# Patient Record
Sex: FEMALE | Race: BLACK OR AFRICAN AMERICAN | Employment: OTHER | URBAN - METROPOLITAN AREA
[De-identification: names, ages, dates, MRNs, and addresses within clinical notes are randomized per-mention and may not be internally consistent; named-entity substitution may affect disease eponyms.]

---

## 2017-08-15 ENCOUNTER — HOSPITAL ENCOUNTER (OUTPATIENT)
Dept: PREADMISSION TESTING | Age: 67
Discharge: HOME | End: 2017-08-15
Payer: MEDICARE

## 2017-08-15 VITALS — HEIGHT: 63 IN | BODY MASS INDEX: 26.05 KG/M2 | WEIGHT: 147 LBS

## 2017-08-15 LAB
ANION GAP BLD CALC-SCNC: 8 MMOL/L (ref 3–18)
BUN SERPL-MCNC: 103 MG/DL (ref 7–18)
BUN/CREAT SERPL: 13 (ref 12–20)
CALCIUM SERPL-MCNC: 8.5 MG/DL (ref 8.5–10.1)
CHLORIDE SERPL-SCNC: 99 MMOL/L (ref 100–108)
CO2 SERPL-SCNC: 34 MMOL/L (ref 21–32)
CREAT SERPL-MCNC: 8.07 MG/DL (ref 0.6–1.3)
EST. AVERAGE GLUCOSE BLD GHB EST-MCNC: 123 MG/DL
GLUCOSE SERPL-MCNC: 112 MG/DL (ref 74–99)
HBA1C MFR BLD: 5.9 % (ref 4.5–5.6)
HCT VFR BLD AUTO: 30.3 % (ref 35–45)
HGB BLD-MCNC: 9.5 G/DL (ref 12–16)
POTASSIUM SERPL-SCNC: 4.9 MMOL/L (ref 3.5–5.5)
SODIUM SERPL-SCNC: 141 MMOL/L (ref 136–145)

## 2017-08-15 PROCEDURE — 85018 HEMOGLOBIN: CPT | Performed by: SURGERY

## 2017-08-15 PROCEDURE — 80048 BASIC METABOLIC PNL TOTAL CA: CPT | Performed by: SURGERY

## 2017-08-15 PROCEDURE — 36415 COLL VENOUS BLD VENIPUNCTURE: CPT | Performed by: SURGERY

## 2017-08-15 PROCEDURE — 93005 ELECTROCARDIOGRAM TRACING: CPT

## 2017-08-15 PROCEDURE — 83036 HEMOGLOBIN GLYCOSYLATED A1C: CPT | Performed by: SURGERY

## 2017-08-15 RX ORDER — SODIUM BICARBONATE 650 MG/1
650 TABLET ORAL 3 TIMES DAILY
COMMUNITY

## 2017-08-15 RX ORDER — INSULIN GLARGINE 100 [IU]/ML
35 INJECTION, SOLUTION SUBCUTANEOUS DAILY
COMMUNITY

## 2017-08-15 RX ORDER — CALCIUM ACETATE 667 MG/1
1 CAPSULE ORAL 2 TIMES DAILY
COMMUNITY

## 2017-08-15 RX ORDER — CARVEDILOL 6.25 MG/1
6.25 TABLET ORAL 2 TIMES DAILY WITH MEALS
COMMUNITY

## 2017-08-15 RX ORDER — CALCITRIOL 0.25 UG/1
0.25 CAPSULE ORAL DAILY
COMMUNITY

## 2017-08-15 RX ORDER — AMLODIPINE BESYLATE 10 MG/1
10 TABLET ORAL DAILY
COMMUNITY

## 2017-08-15 RX ORDER — SODIUM CHLORIDE, SODIUM LACTATE, POTASSIUM CHLORIDE, CALCIUM CHLORIDE 600; 310; 30; 20 MG/100ML; MG/100ML; MG/100ML; MG/100ML
125 INJECTION, SOLUTION INTRAVENOUS CONTINUOUS
Status: CANCELLED | OUTPATIENT
Start: 2017-08-15

## 2017-08-16 LAB
ATRIAL RATE: 76 BPM
CALCULATED P AXIS, ECG09: 60 DEGREES
CALCULATED R AXIS, ECG10: 40 DEGREES
CALCULATED T AXIS, ECG11: 56 DEGREES
DIAGNOSIS, 93000: NORMAL
P-R INTERVAL, ECG05: 142 MS
Q-T INTERVAL, ECG07: 410 MS
QRS DURATION, ECG06: 64 MS
QTC CALCULATION (BEZET), ECG08: 461 MS
VENTRICULAR RATE, ECG03: 76 BPM

## 2017-08-30 NOTE — H&P
PATIENT: Lesia Flowers  YOB: 1950  DATE: 2017 1:15 PM   VISIT TYPE: Consult  _____________________________________________________________    Completed Orders (this encounter)  Order Interpretation Result Next Lab Date   surgery instructions given education        Assessment/Plan  # Detail Type Description    1. Assessment End stage renal disease (N18.6). Impression discussed lap PD cath placement, risks and benefits. Patient Plan lap PD cath placement                  This 74year old female presents for PD cath placement. History of Present Illness:  1. PD cath placement      The symptoms began 1 month ago and generally lasts varies. The symptoms are reported as being severe. The symptoms occur constantly. The location is kidneys. The context of the symptoms include renal failure. The symptoms are described as end stage. Aggravating factors include nothing. Relieving factors include nothing. She states the symptoms are chronic and are poorly controlled. she has never had intra-abdominal surgery                PAST MEDICAL/SURGICAL HISTORY  (Detailed)    Disease/disorder Onset Date Management Date Comments   Diabetes mellitus       Kidney disease         Family History:  (Detailed)  Relationship Family Member Name  Age at Death Condition Onset Age Cause of Death       Family history of Cancer, unknown  N       Family history of Renal disease  N       Family history of Hypertension  N       Family history of Diabetes mellitus  N   Mother    Myocardial infarction  Y       Social History:  (Detailed)  Tobacco use reviewed. Preferred language is [place]. MARITAL STATUS/FAMILY/SOCIAL SUPPORT  Currently . Smoking status: Never smoker. SMOKING STATUS  Use Status Type Smoking Status Usage Per Day Years Used Total Pack Years   no/never  Never smoker          Latter day/SPIRITUAL  The patient has None Quaker affiliation.           Patient Status   Completed with information received for patient transitioning into care. Completed with information received for patient in a summary of care record. Medication Reconciliation  Medications reconciled today. Medication Reviewed  Adherence Medication Name Sig Desc Elsewhere Status   taking as directed Coreg 3.125 mg tablet take 1 tablet by oral route 2 times every day with food Y Verified   taking as directed calcium acetate 667 mg capsule take 2 capsule by oral route 3 times every day with meals Y Verified   taking as directed Norvasc 2.5 mg tablet take 1 tablet by oral route  every day Y Verified   taking as directed Lantus 100 unit/mL subcutaneous solution inject by subcutaneous route as per insulin protocol Y Verified   taking as directed Crestor 5 mg tablet take 2 tablet by oral route  every day Y Verified     Allergies:  Ingredient Reaction Medication Name Comment   NO KNOWN ALLERGIES      (Reviewed, no changes.)  Review of Systems  System Neg/Pos Details   Constitutional Negative Fever, night sweats and weight loss. ENMT Negative Hearing loss, tinnitus, vertigo and voice change. Eyes Negative Diplopia and vision loss. Respiratory Negative Asthma, cough, dyspnea, hemoptysis, known TB exposure and wheezing. Cardio Negative Chest pain, claudication, edema, irregular heartbeat/palpitations and thrombophlebitis. GI Negative Bloating, dysphagia, hemorrhoids, jaundice and reflux.  Negative Dysuria, nocturia, passage stone/gravel and urinary incontinence. Endocrine Negative Cold intolerance and goiter. Neuro Negative Focal weakness, headache, paresthesia, seizures and syncope. Integumentary Negative Change in shape/size of mole(s) and skin lesion. MS Positive Back pain. MS Negative Bone/joint symptoms and muscle weakness. Hema/Lymph Negative Easy bleeding and easy bruising. Allergic/Immuno Negative Contact allergy and contact dermatitis. Vascular Comments Leg cramps.           Vital Signs Height  Time ft in cm Last Measured Height Position   1:58 PM 5.0 3.00 160.02 08/08/2017      Weight/BSA/BMI  Time lb oz kg Context BMI kg/m2 BSA m2   1:58 .00  66.678 dressed with shoes 26.04      Blood Pressure  Time BP mm/Hg Position Side Site Method Cuff Size   1:58 /74 sitting right arm automatic adult     Temperature/Pulse/Respiration  Time Temp F Temp C Temp Site Pulse/min Pattern Resp/ min   1:58 PM 98.50 36.94 ear 85 regular      Measured By  Time Measured by   1:58 PM Kymberly Kim       Physical Exam:  Exam Findings Details   Constitutional Normal No acute distress. Well nourished. Well developed. Eyes Normal General - Right: Normal, Left: Normal. Sclera - Right: Normal, Left: Normal.   Neck Exam Normal Inspection - Normal. Palpation - Normal.   Lymph Detail Normal Anterior cervical. Posterior cervical.   Respiratory Normal Cough - Absent. Effort - Normal.   Cardiovascular Normal Inspection - JVD: Absent. Heart rate - Regular rate. Rhythm - Regular. Abdomen Normal Patient is not obese. No abdominal tenderness. No hepatic enlargement. No spleen enlargement. No hernia. No ascites. No palpable mass. Skin * Inspection - General inspection: warm and dry. Musculoskeletal Normal Visual overview of all four extremities is normal. Gait - Normal.   Extremity Normal No Cyanosis. No Edema. Neurological Normal Level of consciousness - Normal. Orientation - Normal. Memory - Normal.   Psychiatric Normal Orientation - Oriented to time, place, person & situation. No agitation. Not anxious. Appropriate mood and affect.          Medications (added, continued, or stopped this visit):  Started Medication Directions Instruction Stopped    calcium acetate 667 mg capsule take 2 capsule by oral route 3 times every day with meals      Coreg 3.125 mg tablet take 1 tablet by oral route 2 times every day with food      Crestor 5 mg tablet take 2 tablet by oral route  every day      Lantus 100 unit/mL subcutaneous solution inject by subcutaneous route as per insulin protocol      Norvasc 2.5 mg tablet take 1 tablet by oral route  every day     Completed by:        Shelton Asencio 08/09/2017 10:27 AM   Document generated by:  Jass Lopez 08/09/2017 10:27 AM     -----------------------------------------------------------------------------------------------------------                          Electronically signed by Jass Lopez MD on 08/11/2017 02:07 PM

## 2017-08-31 ENCOUNTER — ANESTHESIA (OUTPATIENT)
Dept: SURGERY | Age: 67
End: 2017-08-31
Payer: MEDICARE

## 2017-08-31 ENCOUNTER — HOSPITAL ENCOUNTER (OUTPATIENT)
Age: 67
Setting detail: OUTPATIENT SURGERY
Discharge: HOME | End: 2017-08-31
Attending: SURGERY | Admitting: SURGERY
Payer: MEDICARE

## 2017-08-31 ENCOUNTER — ANESTHESIA EVENT (OUTPATIENT)
Dept: SURGERY | Age: 67
End: 2017-08-31
Payer: MEDICARE

## 2017-08-31 VITALS
HEIGHT: 63 IN | DIASTOLIC BLOOD PRESSURE: 62 MMHG | HEART RATE: 72 BPM | SYSTOLIC BLOOD PRESSURE: 104 MMHG | OXYGEN SATURATION: 98 % | BODY MASS INDEX: 25.6 KG/M2 | TEMPERATURE: 98.1 F | WEIGHT: 144.5 LBS | RESPIRATION RATE: 14 BRPM

## 2017-08-31 LAB
GLUCOSE BLD STRIP.AUTO-MCNC: 85 MG/DL (ref 70–110)
GLUCOSE BLD STRIP.AUTO-MCNC: 92 MG/DL (ref 70–110)
POTASSIUM SERPL-SCNC: 5.1 MMOL/L (ref 3.5–5.5)

## 2017-08-31 PROCEDURE — 77030032490 HC SLV COMPR SCD KNE COVD -B: Performed by: SURGERY

## 2017-08-31 PROCEDURE — 76210000006 HC OR PH I REC 0.5 TO 1 HR: Performed by: SURGERY

## 2017-08-31 PROCEDURE — 76060000032 HC ANESTHESIA 0.5 TO 1 HR: Performed by: SURGERY

## 2017-08-31 PROCEDURE — 77030018836 HC SOL IRR NACL ICUM -A: Performed by: SURGERY

## 2017-08-31 PROCEDURE — 77030008477 HC STYL SATN SLP COVD -A: Performed by: ANESTHESIOLOGY

## 2017-08-31 PROCEDURE — 74011000250 HC RX REV CODE- 250

## 2017-08-31 PROCEDURE — 76010000138 HC OR TIME 0.5 TO 1 HR: Performed by: SURGERY

## 2017-08-31 PROCEDURE — C1750 CATH, HEMODIALYSIS,LONG-TERM: HCPCS | Performed by: SURGERY

## 2017-08-31 PROCEDURE — 82962 GLUCOSE BLOOD TEST: CPT

## 2017-08-31 PROCEDURE — 77030004495 HC ADPT PERI DLYS BAXT -C: Performed by: SURGERY

## 2017-08-31 PROCEDURE — 74011250636 HC RX REV CODE- 250/636

## 2017-08-31 PROCEDURE — 77030003580 HC NDL INSUF VERES J&J -B: Performed by: SURGERY

## 2017-08-31 PROCEDURE — 74011250636 HC RX REV CODE- 250/636: Performed by: SURGERY

## 2017-08-31 PROCEDURE — 84132 ASSAY OF SERUM POTASSIUM: CPT | Performed by: SURGERY

## 2017-08-31 PROCEDURE — 77030008603 HC TRCR ENDOSC EPATH J&J -C: Performed by: SURGERY

## 2017-08-31 PROCEDURE — 36415 COLL VENOUS BLD VENIPUNCTURE: CPT | Performed by: SURGERY

## 2017-08-31 PROCEDURE — 74011000250 HC RX REV CODE- 250: Performed by: SURGERY

## 2017-08-31 PROCEDURE — 77030003009 HC SUT SURG STL J&J -A: Performed by: SURGERY

## 2017-08-31 PROCEDURE — 77030002933 HC SUT MCRYL J&J -A: Performed by: SURGERY

## 2017-08-31 PROCEDURE — 77030006643: Performed by: ANESTHESIOLOGY

## 2017-08-31 PROCEDURE — 77030008683 HC TU ET CUF COVD -A: Performed by: ANESTHESIOLOGY

## 2017-08-31 PROCEDURE — 76210000021 HC REC RM PH II 0.5 TO 1 HR: Performed by: SURGERY

## 2017-08-31 PROCEDURE — 77030020782 HC GWN BAIR PAWS FLX 3M -B: Performed by: SURGERY

## 2017-08-31 DEVICE — IMPLANTABLE DEVICE: Type: IMPLANTABLE DEVICE | Site: PERITONEUM | Status: FUNCTIONAL

## 2017-08-31 RX ORDER — INSULIN LISPRO 100 [IU]/ML
INJECTION, SOLUTION INTRAVENOUS; SUBCUTANEOUS ONCE
Status: DISCONTINUED | OUTPATIENT
Start: 2017-08-31 | End: 2017-08-31 | Stop reason: HOSPADM

## 2017-08-31 RX ORDER — SODIUM CHLORIDE, SODIUM LACTATE, POTASSIUM CHLORIDE, CALCIUM CHLORIDE 600; 310; 30; 20 MG/100ML; MG/100ML; MG/100ML; MG/100ML
50 INJECTION, SOLUTION INTRAVENOUS CONTINUOUS
Status: DISCONTINUED | OUTPATIENT
Start: 2017-08-31 | End: 2017-08-31 | Stop reason: HOSPADM

## 2017-08-31 RX ORDER — FENTANYL CITRATE 50 UG/ML
25 INJECTION, SOLUTION INTRAMUSCULAR; INTRAVENOUS
Status: ACTIVE | OUTPATIENT
Start: 2017-08-31 | End: 2017-08-31

## 2017-08-31 RX ORDER — ROCURONIUM BROMIDE 10 MG/ML
INJECTION, SOLUTION INTRAVENOUS AS NEEDED
Status: DISCONTINUED | OUTPATIENT
Start: 2017-08-31 | End: 2017-08-31 | Stop reason: HOSPADM

## 2017-08-31 RX ORDER — PROPOFOL 10 MG/ML
INJECTION, EMULSION INTRAVENOUS AS NEEDED
Status: DISCONTINUED | OUTPATIENT
Start: 2017-08-31 | End: 2017-08-31 | Stop reason: HOSPADM

## 2017-08-31 RX ORDER — LIDOCAINE HYDROCHLORIDE 20 MG/ML
INJECTION, SOLUTION EPIDURAL; INFILTRATION; INTRACAUDAL; PERINEURAL AS NEEDED
Status: DISCONTINUED | OUTPATIENT
Start: 2017-08-31 | End: 2017-08-31 | Stop reason: HOSPADM

## 2017-08-31 RX ORDER — OXYCODONE AND ACETAMINOPHEN 5; 325 MG/1; MG/1
1 TABLET ORAL
Qty: 30 TAB | Refills: 0 | Status: SHIPPED | OUTPATIENT
Start: 2017-08-31

## 2017-08-31 RX ORDER — DEXTROSE 50 % IN WATER (D50W) INTRAVENOUS SYRINGE
25-50 AS NEEDED
Status: DISCONTINUED | OUTPATIENT
Start: 2017-08-31 | End: 2017-08-31 | Stop reason: HOSPADM

## 2017-08-31 RX ORDER — SODIUM CHLORIDE 0.9 % (FLUSH) 0.9 %
5-10 SYRINGE (ML) INJECTION AS NEEDED
Status: DISCONTINUED | OUTPATIENT
Start: 2017-08-31 | End: 2017-08-31 | Stop reason: HOSPADM

## 2017-08-31 RX ORDER — NEOSTIGMINE METHYLSULFATE 5 MG/5 ML
SYRINGE (ML) INTRAVENOUS AS NEEDED
Status: DISCONTINUED | OUTPATIENT
Start: 2017-08-31 | End: 2017-08-31 | Stop reason: HOSPADM

## 2017-08-31 RX ORDER — NALOXONE HYDROCHLORIDE 0.4 MG/ML
0.1 INJECTION, SOLUTION INTRAMUSCULAR; INTRAVENOUS; SUBCUTANEOUS
Status: DISCONTINUED | OUTPATIENT
Start: 2017-08-31 | End: 2017-08-31 | Stop reason: HOSPADM

## 2017-08-31 RX ORDER — MIDAZOLAM HYDROCHLORIDE 1 MG/ML
INJECTION, SOLUTION INTRAMUSCULAR; INTRAVENOUS AS NEEDED
Status: DISCONTINUED | OUTPATIENT
Start: 2017-08-31 | End: 2017-08-31 | Stop reason: HOSPADM

## 2017-08-31 RX ORDER — MAGNESIUM SULFATE 100 %
4 CRYSTALS MISCELLANEOUS AS NEEDED
Status: DISCONTINUED | OUTPATIENT
Start: 2017-08-31 | End: 2017-08-31 | Stop reason: HOSPADM

## 2017-08-31 RX ORDER — OXYCODONE AND ACETAMINOPHEN 5; 325 MG/1; MG/1
1 TABLET ORAL AS NEEDED
Status: DISCONTINUED | OUTPATIENT
Start: 2017-08-31 | End: 2017-08-31 | Stop reason: HOSPADM

## 2017-08-31 RX ORDER — BUPIVACAINE HYDROCHLORIDE 5 MG/ML
INJECTION, SOLUTION EPIDURAL; INTRACAUDAL AS NEEDED
Status: DISCONTINUED | OUTPATIENT
Start: 2017-08-31 | End: 2017-08-31 | Stop reason: HOSPADM

## 2017-08-31 RX ORDER — ONDANSETRON 2 MG/ML
4 INJECTION INTRAMUSCULAR; INTRAVENOUS ONCE
Status: DISCONTINUED | OUTPATIENT
Start: 2017-08-31 | End: 2017-08-31 | Stop reason: HOSPADM

## 2017-08-31 RX ORDER — GLYCOPYRROLATE 0.2 MG/ML
INJECTION INTRAMUSCULAR; INTRAVENOUS AS NEEDED
Status: DISCONTINUED | OUTPATIENT
Start: 2017-08-31 | End: 2017-08-31 | Stop reason: HOSPADM

## 2017-08-31 RX ORDER — HYDROMORPHONE HYDROCHLORIDE 2 MG/ML
0.5 INJECTION, SOLUTION INTRAMUSCULAR; INTRAVENOUS; SUBCUTANEOUS
Status: DISCONTINUED | OUTPATIENT
Start: 2017-08-31 | End: 2017-08-31 | Stop reason: HOSPADM

## 2017-08-31 RX ORDER — EPHEDRINE SULFATE/0.9% NACL/PF 25 MG/5 ML
SYRINGE (ML) INTRAVENOUS AS NEEDED
Status: DISCONTINUED | OUTPATIENT
Start: 2017-08-31 | End: 2017-08-31 | Stop reason: HOSPADM

## 2017-08-31 RX ORDER — SODIUM CHLORIDE 9 MG/ML
500 INJECTION, SOLUTION INTRAVENOUS CONTINUOUS
Status: DISCONTINUED | OUTPATIENT
Start: 2017-08-31 | End: 2017-08-31 | Stop reason: HOSPADM

## 2017-08-31 RX ORDER — FENTANYL CITRATE 50 UG/ML
INJECTION, SOLUTION INTRAMUSCULAR; INTRAVENOUS AS NEEDED
Status: DISCONTINUED | OUTPATIENT
Start: 2017-08-31 | End: 2017-08-31 | Stop reason: HOSPADM

## 2017-08-31 RX ORDER — ONDANSETRON 2 MG/ML
INJECTION INTRAMUSCULAR; INTRAVENOUS AS NEEDED
Status: DISCONTINUED | OUTPATIENT
Start: 2017-08-31 | End: 2017-08-31 | Stop reason: HOSPADM

## 2017-08-31 RX ADMIN — PROPOFOL 100 MG: 10 INJECTION, EMULSION INTRAVENOUS at 15:24

## 2017-08-31 RX ADMIN — Medication 15 MG: at 16:05

## 2017-08-31 RX ADMIN — GLYCOPYRROLATE 0.2 MG: 0.2 INJECTION INTRAMUSCULAR; INTRAVENOUS at 15:58

## 2017-08-31 RX ADMIN — SODIUM CHLORIDE 500 ML: 900 INJECTION, SOLUTION INTRAVENOUS at 12:50

## 2017-08-31 RX ADMIN — ROCURONIUM BROMIDE 30 MG: 10 INJECTION, SOLUTION INTRAVENOUS at 15:24

## 2017-08-31 RX ADMIN — FENTANYL CITRATE 25 MCG: 50 INJECTION, SOLUTION INTRAMUSCULAR; INTRAVENOUS at 16:02

## 2017-08-31 RX ADMIN — GLYCOPYRROLATE 0.2 MG: 0.2 INJECTION INTRAMUSCULAR; INTRAVENOUS at 15:35

## 2017-08-31 RX ADMIN — MIDAZOLAM HYDROCHLORIDE 2 MG: 1 INJECTION, SOLUTION INTRAMUSCULAR; INTRAVENOUS at 15:17

## 2017-08-31 RX ADMIN — FENTANYL CITRATE 50 MCG: 50 INJECTION, SOLUTION INTRAMUSCULAR; INTRAVENOUS at 15:17

## 2017-08-31 RX ADMIN — FENTANYL CITRATE 50 MCG: 50 INJECTION, SOLUTION INTRAMUSCULAR; INTRAVENOUS at 15:37

## 2017-08-31 RX ADMIN — ONDANSETRON 4 MG: 2 INJECTION INTRAMUSCULAR; INTRAVENOUS at 15:44

## 2017-08-31 RX ADMIN — Medication 2 MG: at 15:58

## 2017-08-31 RX ADMIN — Medication 15 MG: at 15:40

## 2017-08-31 RX ADMIN — FENTANYL CITRATE 25 MCG: 50 INJECTION, SOLUTION INTRAMUSCULAR; INTRAVENOUS at 15:55

## 2017-08-31 RX ADMIN — LIDOCAINE HYDROCHLORIDE 80 MG: 20 INJECTION, SOLUTION EPIDURAL; INFILTRATION; INTRACAUDAL; PERINEURAL at 15:24

## 2017-08-31 NOTE — ANESTHESIA POSTPROCEDURE EVALUATION
Post-Anesthesia Evaluation and Assessment    Patient: Carmelita Sandoval MRN: [id number]  SSN: [id number]   YOB: 1950  Age: 74 y.o. Sex: female      Cardiovascular Function/Vital Signs  /58  Pulse 74  Temp 36.3 °C (97.3 °F)  Resp 15  Ht 5' 3\" (1.6 m)  Wt 65.5 kg (144 lb 8 oz)  SpO2 95%  BMI 25.6 kg/m2    Patient is status post Procedure(s):  LAPAROSCOPIC PERITONEAL DIALYSIS CATHETER PLACEMENT. Nausea/Vomiting: Controlled. Postoperative hydration reviewed and adequate. Pain:  Pain Scale 1: Numeric (0 - 10) (08/31/17 1645)  Pain Intensity 1: 0 (08/31/17 1645)   Managed. Neurological Status:   Neuro (WDL): Exceptions to WDL (08/31/17 1645)   At baseline. Mental Status and Level of Consciousness: Awake/alert    Pulmonary Status:   O2 Device: Room air (08/31/17 1654)   Adequate oxygenation and airway patent. Complications related to anesthesia: None    Post-anesthesia assessment completed. No concerns. Patient has met all discharge requirements.     Signed By: Cindy Dean CRNA    August 31, 2017

## 2017-08-31 NOTE — DISCHARGE INSTRUCTIONS
Peritoneal Dialysis Catheter Care: Care Instructions  Your Care Instructions    Dialysis does the work of your kidneys when you have kidney failure. It filters wastes and removes extra fluid. It also keeps the right balance of chemicals in your blood. Peritoneal dialysis uses the lining of your belly to filter your blood. Before you start dialysis, your doctor creates a dialysis access. This is the place where the dialysis solution can flow into and out of your body. To make the access, the doctor places a soft tube in your belly. This tube is called a catheter. When you do dialysis, the solution flows into your belly and stays there for several hours. Then you remove it through the catheter. It is important to take care of the catheter and the access area to prevent infection. Follow-up care is a key part of your treatment and safety. Be sure to make and go to all appointments, and call your doctor if you are having problems. It's also a good idea to know your test results and keep a list of the medicines you take. How can you care for yourself at home? Care of the catheter and access  · After the doctor creates your access, keep the bandage dry and clean. Change a dirty or bloody bandage. · Keep your access area clean and dry. Check it every day for signs of infection. · Always clean and dry your catheter and access area right away after you get wet. · Always wash your hands before you touch the catheter. · Fasten or tape the catheter to your body to keep it from catching on your clothes. · Never use scissors or other sharp objects around your catheter. · Do not use unapproved clamps on your catheter. · Store your dialysis supplies in a cool, dry place. Activity when you have an access  · Do not lift heavy objects. · Do not swim or take a bath unless your doctor tells you it is okay. When should you call for help?   Call your doctor now or seek immediate medical care if:  · You have signs of infection, such as:  ¨ Increased pain, swelling, warmth, or redness. ¨ Red streaks leading from the access area. ¨ Pus draining from the access area. ¨ A fever. · You have belly pain. Watch closely for changes in your health, and be sure to contact your doctor if:  · You have nausea and vomiting. · The dialysis fluid looks cloudy or is a different color. · Fluid does not flow through the catheter. Where can you learn more? Go to http://ady-angelika.info/. Enter R310 in the search box to learn more about \"Peritoneal Dialysis Catheter Care: Care Instructions. \"  Current as of: April 3, 2017  Content Version: 11.3  © 9614-5621 [employer]. Care instructions adapted under license by [employer] (which disclaims liability or warranty for this information). If you have questions about a medical condition or this instruction, always ask your healthcare professional. [place] [address] any warranty or liability for your use of this information. The dressing on your abdomen should stay clean, dry, and firmly taped. Do not take showers or baths. Do not get the dressings wet until the Peritoneal Dialysis staff tells you it is okay. If the dressings become loose, add more tape and gauze if needed. Do not lift more than 15 pounds for the 1st week. Then lift no more than 30 pounds for 2 months. Avoid straining while having a bowel movement. Regular diet as tolerated. DISCHARGE SUMMARY from Nurse    The following personal items are in your possession at time of discharge:    Dental Appliances: None  Visual Aid: Glasses, With patient     Home Medications: None  Jewelry: None  Clothing:  Footwear, Pants, Shirt, Socks, Undergarments (locker 9)  Other Valuables: Eyeglasses (given to daughter)             PATIENT INSTRUCTIONS:    After general anesthesia or intravenous sedation, for 24 hours or while taking prescription Narcotics:  · Limit your activities  · Do not drive and operate hazardous machinery  · Do not make important personal or business decisions  · Do  not drink alcoholic beverages  · If you have not urinated within 8 hours after discharge, please contact your surgeon on call. Report the following to your surgeon:  · Excessive pain, swelling, redness or odor of or around the surgical area  · Temperature over 100.5  · Nausea and vomiting lasting longer than 4 hours or if unable to take medications  · Any signs of decreased circulation or nerve impairment to extremity: change in color, persistent  numbness, tingling, coldness or increase pain  · Any questions        What to do at Home:  Recommended activity: Activity as tolerated and Ambulate in house, no heavy lifting or strenuous activity. If you experience any of the following symptoms fever greater than 101.0, chills, nausea or vomiting, drainage from insertion site or incision site , please follow up with Dr. Jackson Boyd. .      *  Please give a list of your current medications to your Primary Care Provider. *  Please update this list whenever your medications are discontinued, doses are      changed, or new medications (including over-the-counter products) are added. *  Please carry medication information at all times in case of emergency situations. These are general instructions for a healthy lifestyle:    No smoking/ No tobacco products/ Avoid exposure to second hand smoke    Surgeon General's Warning:  Quitting smoking now greatly reduces serious risk to your health.     Obesity, smoking, and sedentary lifestyle greatly increases your risk for illness    A healthy diet, regular physical exercise & weight monitoring are important for maintaining a healthy lifestyle    You may be retaining fluid if you have a history of heart failure or if you experience any of the following symptoms:  Weight gain of 3 pounds or more overnight or 5 pounds in a week, increased swelling in our hands or feet or shortness of breath while lying flat in bed. Please call your doctor as soon as you notice any of these symptoms; do not wait until your next office visit. Recognize signs and symptoms of STROKE:    F-face looks uneven    A-arms unable to move or move unevenly    S-speech slurred or non-existent    T-time-call 911 as soon as signs and symptoms begin-DO NOT go       Back to bed or wait to see if you get better-TIME IS BRAIN. Warning Signs of HEART ATTACK     Call 911 if you have these symptoms:   Chest discomfort. Most heart attacks involve discomfort in the center of the chest that lasts more than a few minutes, or that goes away and comes back. It can feel like uncomfortable pressure, squeezing, fullness, or pain.  Discomfort in other areas of the upper body. Symptoms can include pain or discomfort in one or both arms, the back, neck, jaw, or stomach.  Shortness of breath with or without chest discomfort.  Other signs may include breaking out in a cold sweat, nausea, or lightheadedness. Don't wait more than five minutes to call 911 - MINUTES MATTER! Fast action can save your life. Calling 911 is almost always the fastest way to get lifesaving treatment. Emergency Medical Services staff can begin treatment when they arrive -- up to an hour sooner than if someone gets to the hospital by car. The discharge information has been reviewed with the patient and caregiver. The patient and caregiver verbalized understanding. Discharge medications reviewed with the patient and caregiver and appropriate educational materials and side effects teaching were provided. Patient armband removed and shredded.

## 2017-08-31 NOTE — ANESTHESIA PREPROCEDURE EVALUATION
Anesthetic History   No history of anesthetic complications            Review of Systems / Medical History  Patient summary reviewed, nursing notes reviewed and pertinent labs reviewed    Pulmonary  Within defined limits                 Neuro/Psych   Within defined limits           Cardiovascular    Hypertension                   GI/Hepatic/Renal     GERD      PUD     Endo/Other    Diabetes    Arthritis     Other Findings              Physical Exam    Airway  Mallampati: II  TM Distance: 4 - 6 cm  Neck ROM: normal range of motion   Mouth opening: Diminished (comment)     Cardiovascular  Regular rate and rhythm,  S1 and S2 normal,  no murmur, click, rub, or gallop             Dental    Dentition: Poor dentition     Pulmonary  Breath sounds clear to auscultation               Abdominal  Abdominal exam normal       Other Findings            Anesthetic Plan    ASA: 3  Anesthesia type: general          Induction: Intravenous  Anesthetic plan and risks discussed with: Family and Patient

## 2017-08-31 NOTE — OP NOTES
[address] [place] [address]  OPERATIVE REPORT    Name:  Edilson Wiggins  MR#:  [id number]  :  1950  Account #:  [de-identified]  Date of Adm:  2017  Date of Surgery:  2017      PREOPERATIVE DIAGNOSIS: End-stage renal disease. POSTOPERATIVE DIAGNOSIS: End-stage renal disease. PROCEDURES PERFORMED: Laparoscopic placement of peritoneal  dialysis catheter. ATTENDING SURGEON: Shayne Prescott MD    ESTIMATED BLOOD LOSS: 5 mL. SPECIMENS REMOVED: None. ANESTHESIA: General and local.    INDICATION FOR PROCEDURE: This is a 40-year-old female with  end-stage renal disease requiring dialysis and she is brought to the  operating room for placement of a peritoneal dialysis catheter. DESCRIPTION OF PROCEDURE: The patient was brought in the  operating room and placed on the table in the supine position. After  placing monitors and adequate general anesthesia, the abdomen was  prepped and draped in the usual sterile fashion. SCD hose were  placed. A small horizontal incision was made superior to the umbilicus  through the skin down to subcutaneous tissue. A Veress needle was  placed in the abdomen, it was insufflated with carbon dioxide to 15  mmHg pressure. A 5 mm port was placed at this location and the  scope was placed into the peritoneal cavity. A small horizontal incision  was made in the left paramedian area. The fascia was divided for  about 0.5 cm and then the sheath and dilator for the catheter was  placed through the muscle and then tunneled in the preperitoneal  space and then entered the peritoneal cavity. The catheter was then  placed through the sheath after the dilator was removed, such that the  curl catheter was in the pelvis. The first cuff was in the preperitoneal  space.  A small incision was made to the left and superior to the second  incision and the catheter was tunneled in a curvilinear fashion such  that the second cuff was in the subcutaneous tissue and the catheter is  pointing in an inferior direction. The catheter was flushed with 250 mL  of heparinized saline, and it flowed well. The camera and port were  then removed. There was good hemostasis. Subcutaneous tissue was  reapproximated with interrupted 3-0 Monocryl suture and 4-0 Monocryl  was used to reapproximate the skin. Steri-Strips were applied and the  wound was dressed sterilely. The sponge, instrument, and needle  count was correct at the end of the procedure.         MD Isabel Waldron / Shazia Gorman  D:  08/31/2017   16:03  T:  08/31/2017   17:00  Job #:  [id number]

## 2017-08-31 NOTE — PERIOP NOTES
TRANSFER - IN REPORT:    Verbal report received from Arpit Carr CRNA and CHUYITA Merritt RN(name) on [employer]  being received from OR(unit) for routine post - op      Report consisted of patients Situation, Background, Assessment and   Recommendations(SBAR). Information from the following report(s) SBAR, OR Summary, Procedure Summary, Intake/Output and MAR was reviewed with the receiving nurse. Opportunity for questions and clarification was provided. Assessment completed upon patients arrival to unit and care assumed.

## 2017-08-31 NOTE — IP AVS SNAPSHOT
[address] [address] Patient: Chip Pena MRN: [id number] You are allergic to the following No active allergies Recent Documentation Height Weight BMI OB Status Smoking Status 1.6 m 65.5 kg 25.6 kg/m2 Postmenopausal Never Smoker Emergency Contacts Name Discharge Info Relation Home Work Mobile Sarah Rodriguez DISCHARGE CAREGIVER [3] Daughter [21]   [phone] About your hospitalization You were admitted on:  August 31, 2017 You last received care in the:  [facility] PHASE 2 RECOVERY You were discharged on:  August 31, 2017 Unit phone number:    
  
Why you were hospitalized Your primary diagnosis was:  Not on File Providers Seen During Your Hospitalizations Provider Role Specialty Primary office phone Terence Newby MD Attending Provider General Surgery [phone] Your Primary Care Physician (PCP) Primary Care Physician Office Phone Office Fax [facility], [address] [employer] [phone] Follow-up Information Follow up With Details Comments Contact Info Monica Park MD   [facility] [place] [address] 
[phone] Terence Newby MD Follow up in 1 week(s)  [address] [facility] [address] [place] [address] 
[phone] Current Discharge Medication List  
  
START taking these medications Dose & Instructions Dispensing Information Comments Morning Noon Evening Bedtime  
 oxyCODONE-acetaminophen 5-325 mg per tablet Commonly known as:  PERCOCET Your last dose was: Your next dose is:    
   
   
 Dose:  1 Tab Take 1 Tab by mouth every six (6) hours as needed for Pain. Max Daily Amount: 4 Tabs. Quantity:  30 Tab Refills:  0 CONTINUE these medications which have NOT CHANGED Dose & Instructions Dispensing Information Comments Morning Noon Evening Bedtime  
 amLODIPine 10 mg tablet Commonly known as:  Patrick Rudd Your last dose was: Your next dose is:    
   
   
 Dose:  10 mg Take 10 mg by mouth daily. Refills:  0  
     
   
   
   
  
 calcitRIOL 0.25 mcg capsule Commonly known as:  ROCALTROL Your last dose was: Your next dose is:    
   
   
 Dose:  0.25 mcg Take 0.25 mcg by mouth daily. Refills:  0  
     
   
   
   
  
 calcium acetate 667 mg Cap Commonly known as:  PHOSLO Your last dose was: Your next dose is:    
   
   
 Dose:  1 Cap Take 1 Cap by mouth two (2) times a day. Refills:  0  
     
   
   
   
  
 carvedilol 6.25 mg tablet Commonly known as:  Edis Heal Your last dose was: Your next dose is:    
   
   
 Dose:  6.25 mg Take 6.25 mg by mouth two (2) times daily (with meals). Refills:  0  
     
   
   
   
  
 LANTUS SOLOSTAR 100 unit/mL (3 mL) Inpn Generic drug:  insulin glargine Your last dose was: Your next dose is:    
   
   
 Dose:  35 Units 35 Units by SubCUTAneous route daily. Refills:  0  
     
   
   
   
  
 sodium bicarbonate 650 mg tablet Your last dose was: Your next dose is:    
   
   
 Dose:  650 mg Take 650 mg by mouth three (3) times daily. Refills:  0 Where to Get Your Medications Information on where to get these meds will be given to you by the nurse or doctor. ! Ask your nurse or doctor about these medications  
  oxyCODONE-acetaminophen 5-325 mg per tablet Discharge Instructions Peritoneal Dialysis Catheter Care: Care Instructions Your Care Instructions Dialysis does the work of your kidneys when you have kidney failure. It filters wastes and removes extra fluid. It also keeps the right balance of chemicals in your blood. Peritoneal dialysis uses the lining of your belly to filter your blood. Before you start dialysis, your doctor creates a dialysis access. This is the place where the dialysis solution can flow into and out of your body. To make the access, the doctor places a soft tube in your belly. This tube is called a catheter. When you do dialysis, the solution flows into your belly and stays there for several hours. Then you remove it through the catheter. It is important to take care of the catheter and the access area to prevent infection. Follow-up care is a key part of your treatment and safety. Be sure to make and go to all appointments, and call your doctor if you are having problems. It's also a good idea to know your test results and keep a list of the medicines you take. How can you care for yourself at home? Care of the catheter and access · After the doctor creates your access, keep the bandage dry and clean. Change a dirty or bloody bandage. · Keep your access area clean and dry. Check it every day for signs of infection. · Always clean and dry your catheter and access area right away after you get wet. · Always wash your hands before you touch the catheter. · Fasten or tape the catheter to your body to keep it from catching on your clothes. · Never use scissors or other sharp objects around your catheter. · Do not use unapproved clamps on your catheter. · Store your dialysis supplies in a cool, dry place. Activity when you have an access · Do not lift heavy objects. · Do not swim or take a bath unless your doctor tells you it is okay. When should you call for help? Call your doctor now or seek immediate medical care if: 
· You have signs of infection, such as: 
¨ Increased pain, swelling, warmth, or redness. ¨ Red streaks leading from the access area. ¨ Pus draining from the access area. ¨ A fever. · You have belly pain. Watch closely for changes in your health, and be sure to contact your doctor if: 
· You have nausea and vomiting. · The dialysis fluid looks cloudy or is a different color. · Fluid does not flow through the catheter. Where can you learn more? Go to http://ady-angelika.info/. Enter R310 in the search box to learn more about \"Peritoneal Dialysis Catheter Care: Care Instructions. \" Current as of: April 3, 2017 Content Version: 11.3 © 4810-3185 [employer]. Care instructions adapted under license by [employer] (which disclaims liability or warranty for this information). If you have questions about a medical condition or this instruction, always ask your healthcare professional. [place] [address] any warranty or liability for your use of this information. The dressing on your abdomen should stay clean, dry, and firmly taped. Do not take showers or baths. Do not get the dressings wet until the Peritoneal Dialysis staff tells you it is okay. If the dressings become loose, add more tape and gauze if needed. Do not lift more than 15 pounds for the 1st week. Then lift no more than 30 pounds for 2 months. Avoid straining while having a bowel movement. Regular diet as tolerated. DISCHARGE SUMMARY from Nurse The following personal items are in your possession at time of discharge: 
 
Dental Appliances: None Visual Aid: Glasses, With patient Home Medications: None Jewelry: None Clothing: Footwear, Pants, Shirt, Socks, Undergarments (locker 9) Other Valuables: Eyeglasses (given to daughter) PATIENT INSTRUCTIONS: 
 
After general anesthesia or intravenous sedation, for 24 hours or while taking prescription Narcotics: · Limit your activities · Do not drive and operate hazardous machinery · Do not make important personal or business decisions · Do  not drink alcoholic beverages · If you have not urinated within 8 hours after discharge, please contact your surgeon on call. Report the following to your surgeon: · Excessive pain, swelling, redness or odor of or around the surgical area · Temperature over 100.5 · Nausea and vomiting lasting longer than 4 hours or if unable to take medications · Any signs of decreased circulation or nerve impairment to extremity: change in color, persistent  numbness, tingling, coldness or increase pain · Any questions What to do at Home: 
Recommended activity: Activity as tolerated and Ambulate in house, no heavy lifting or strenuous activity. If you experience any of the following symptoms fever greater than 101.0, chills, nausea or vomiting, drainage from insertion site or incision site , please follow up with Dr. Bill Jean. . 
 
 
*  Please give a list of your current medications to your Primary Care Provider. *  Please update this list whenever your medications are discontinued, doses are 
    changed, or new medications (including over-the-counter products) are added. *  Please carry medication information at all times in case of emergency situations. These are general instructions for a healthy lifestyle: No smoking/ No tobacco products/ Avoid exposure to second hand smoke Surgeon General's Warning:  Quitting smoking now greatly reduces serious risk to your health. Obesity, smoking, and sedentary lifestyle greatly increases your risk for illness A healthy diet, regular physical exercise & weight monitoring are important for maintaining a healthy lifestyle You may be retaining fluid if you have a history of heart failure or if you experience any of the following symptoms:  Weight gain of 3 pounds or more overnight or 5 pounds in a week, increased swelling in our hands or feet or shortness of breath while lying flat in bed. Please call your doctor as soon as you notice any of these symptoms; do not wait until your next office visit. Recognize signs and symptoms of STROKE: 
 
F-face looks uneven A-arms unable to move or move unevenly S-speech slurred or non-existent T-time-call 911 as soon as signs and symptoms begin-DO NOT go Back to bed or wait to see if you get better-TIME IS BRAIN. Warning Signs of HEART ATTACK Call 911 if you have these symptoms: 
? Chest discomfort. Most heart attacks involve discomfort in the center of the chest that lasts more than a few minutes, or that goes away and comes back. It can feel like uncomfortable pressure, squeezing, fullness, or pain. ? Discomfort in other areas of the upper body. Symptoms can include pain or discomfort in one or both arms, the back, neck, jaw, or stomach. ? Shortness of breath with or without chest discomfort. ? Other signs may include breaking out in a cold sweat, nausea, or lightheadedness. Don't wait more than five minutes to call [address]! Fast action can save your life. Calling 911 is almost always the fastest way to get lifesaving treatment. Emergency Medical Services staff can begin treatment when they arrive  up to an hour sooner than if someone gets to the hospital by car. The discharge information has been reviewed with the patient and caregiver. The patient and caregiver verbalized understanding. Discharge medications reviewed with the patient and caregiver and appropriate educational materials and side effects teaching were provided. Patient armband removed and shredded. Discharge Orders None Introducing [facility]! [employer] introduces [employer] patient portal. Now you can access parts of your medical record, email your doctor's office, and request medication refills online. 1. In your internet browser, go to [email or web address] 2. Click on the First Time User? Click Here link in the Sign In box. You will see the New Member Sign Up page. 3. Enter your [employer] Access Code exactly as it appears below.  You will not need to use this code after youve completed the sign-up process. If you do not sign up before the expiration date, you must request a new code. · [employer] Access Code: [email or web address] Expires: 11/8/2017  1:24 PM 
 
4. Enter the last four digits of your Social Security Number (xxxx) and Date of Birth (mm/dd/yyyy) as indicated and click Submit. You will be taken to the next sign-up page. 5. Create a [employer] ID. This will be your [employer] login ID and cannot be changed, so think of one that is secure and easy to remember. 6. Create a [employer] password. You can change your password at any time. 7. Enter your Password Reset Question and Answer. This can be used at a later time if you forget your password. 8. Enter your e-mail address. You will receive e-mail notification when new information is available in [address] 9. Click Sign Up. You can now view and download portions of your medical record. 10. Click the Download Summary menu link to download a portable copy of your medical information. If you have questions, please visit the Frequently Asked Questions section of the [employer] website. Remember, [employer] is NOT to be used for urgent needs. For medical emergencies, dial 911. Now available from your iPhone and Android! General Information Please provide this summary of care documentation to your next provider. Patient Signature:  ____________________________________________________________ Date:  ____________________________________________________________  
  
[facility] [place] Provider Signature:  ____________________________________________________________ Date:  ____________________________________________________________

## 2017-08-31 NOTE — INTERVAL H&P NOTE
H&P Update:  Laisha Burger was seen and examined. History and physical has been reviewed. The patient has been examined. There have been no significant clinical changes since the completion of the originally dated History and Physical.  Patient identified by surgeon; surgical site was confirmed by patient and surgeon.     Signed By: Tonja Mendiola MD     August 31, 2017 2:47 PM

## (undated) DEVICE — AMD ANTIMICROBIAL DRAIN SPONGES, 6 PLY, 0.2% POLYHEXAMETHYLENE BIGUANIDE HCI (PHMB): Brand: EXCILON

## (undated) DEVICE — NEEDLE INSUF L120MM DIA2MM DISP FOR PNEUMOPERI ENDOPATH

## (undated) DEVICE — (D)STRIP SKN CLSR 0.5X4IN WHT --

## (undated) DEVICE — TROCAR ENDOSCP SHFT L100MM DIA5MM DIL TIP ENDOPATH XCEL

## (undated) DEVICE — GOWN,SIRUS,NONRNF,SETINSLV,XL,20/CS: Brand: MEDLINE

## (undated) DEVICE — THIS ADAPTER IS A DOUBLE SEALING FEMALE LUER LOCK ADAPTER WITH A 2-PIECE, COMBINATION COMPRESSION FIT/BARBED CATHETER CONNECTOR. THE ADAPTER IS USED TO CONNECT THE PD CATHETER TO A SOLUTION TRANSFER SET WITH LOCKING CONNECTOR.: Brand: LOCKING TITANIUM ADAPTER FOR PERITONEAL DIALYSIS CATHETER

## (undated) DEVICE — SUTURE S STL SZ 7 L18IN NONABSORBABLE SIL TIE MFIL DS18

## (undated) DEVICE — MASTISOL ADHESIVE LIQ 2/3ML

## (undated) DEVICE — SUT MCRYL + 4-0 18IN PS-2 UND --

## (undated) DEVICE — SOL IRRIGATION INJ NACL 0.9% 500ML BTL

## (undated) DEVICE — COVADERM PLUS: Brand: DEROYAL

## (undated) DEVICE — STERILE POLYISOPRENE POWDER-FREE SURGICAL GLOVES WITH EMOLLIENT COATING: Brand: PROTEXIS

## (undated) DEVICE — SUT MONOCRYL PLUS UD 3-0 --

## (undated) DEVICE — Device

## (undated) DEVICE — KENDALL SCD EXPRESS SLEEVES, KNEE LENGTH, MEDIUM: Brand: KENDALL SCD

## (undated) DEVICE — SYRINGE MED 20ML STD CLR PLAS LUERLOCK TIP N CTRL DISP